# Patient Record
Sex: MALE | URBAN - METROPOLITAN AREA
[De-identification: names, ages, dates, MRNs, and addresses within clinical notes are randomized per-mention and may not be internally consistent; named-entity substitution may affect disease eponyms.]

---

## 2020-09-01 ENCOUNTER — OFFICE VISIT (OUTPATIENT)
Dept: URBAN - METROPOLITAN AREA CLINIC 113 | Facility: CLINIC | Age: 55
End: 2020-09-01

## 2020-09-02 ENCOUNTER — OFFICE VISIT (OUTPATIENT)
Dept: URBAN - METROPOLITAN AREA CLINIC 113 | Facility: CLINIC | Age: 55
End: 2020-09-02
Payer: SELF-PAY

## 2020-09-02 ENCOUNTER — DASHBOARD ENCOUNTERS (OUTPATIENT)
Age: 55
End: 2020-09-02

## 2020-09-02 ENCOUNTER — TELEPHONE ENCOUNTER (OUTPATIENT)
Dept: URBAN - METROPOLITAN AREA CLINIC 113 | Facility: CLINIC | Age: 55
End: 2020-09-02

## 2020-09-02 VITALS
HEIGHT: 78 IN | BODY MASS INDEX: 26.8 KG/M2 | SYSTOLIC BLOOD PRESSURE: 176 MMHG | TEMPERATURE: 99.1 F | DIASTOLIC BLOOD PRESSURE: 114 MMHG | WEIGHT: 231.6 LBS | HEART RATE: 100 BPM

## 2020-09-02 DIAGNOSIS — R14.0 BLOATING: ICD-10-CM

## 2020-09-02 PROCEDURE — G8427 DOCREV CUR MEDS BY ELIG CLIN: HCPCS | Performed by: PHYSICIAN ASSISTANT

## 2020-09-02 PROCEDURE — 99203 OFFICE O/P NEW LOW 30 MIN: CPT | Performed by: PHYSICIAN ASSISTANT

## 2020-09-02 RX ORDER — METRONIDAZOLE 500 MG/1
1 TABLET TABLET, FILM COATED ORAL THREE TIMES A DAY
Qty: 30 TABLET | Refills: 0 | OUTPATIENT
Start: 2020-09-02 | End: 2020-09-12

## 2020-09-02 RX ORDER — METRONIDAZOLE 500 MG/1
1 TABLET TABLET, FILM COATED ORAL THREE TIMES A DAY
Qty: 30 TABLET | Refills: 0
Start: 2020-09-02

## 2020-09-02 NOTE — HPI-TODAY'S VISIT:
Mr. Díaz is a 55-year-old gentleman presenting for evaluation of bloating. He has felt bloated for the last 4-5 weeks.  He has very minimal abdominal pain.  His bowel habits are normal, he has a bowel movement daily.  Bloating does not change after passing stool.  He reports increased amounts of gas.  He denies any nausea, vomiting.  He occasionally has heartburn. He has taken magnesium citrate and MiraLax without improvement. His weight is stable. He has never had a colonoscopy. He was seen in the ED on 8/8/2020.  CBC, CMP and lipase were unremarkable.  CT of the abdomen and pelvis with contrast showed no acute findings.

## 2020-11-06 ENCOUNTER — OFFICE VISIT (OUTPATIENT)
Dept: URBAN - METROPOLITAN AREA CLINIC 113 | Facility: CLINIC | Age: 55
End: 2020-11-06

## 2020-11-06 RX ORDER — METRONIDAZOLE 500 MG/1
1 TABLET TABLET, FILM COATED ORAL THREE TIMES A DAY
Qty: 30 TABLET | Refills: 0 | Status: ACTIVE | COMMUNITY
Start: 2020-09-02